# Patient Record
Sex: FEMALE | URBAN - METROPOLITAN AREA
[De-identification: names, ages, dates, MRNs, and addresses within clinical notes are randomized per-mention and may not be internally consistent; named-entity substitution may affect disease eponyms.]

---

## 2019-02-08 ENCOUNTER — THERAPY VISIT (OUTPATIENT)
Dept: PHYSICAL THERAPY | Facility: CLINIC | Age: 39
End: 2019-02-08
Payer: COMMERCIAL

## 2019-02-08 DIAGNOSIS — G89.29 CHRONIC LEFT SHOULDER PAIN: ICD-10-CM

## 2019-02-08 DIAGNOSIS — M25.512 CHRONIC LEFT SHOULDER PAIN: ICD-10-CM

## 2019-02-08 PROCEDURE — 97110 THERAPEUTIC EXERCISES: CPT | Mod: GP | Performed by: PHYSICAL THERAPIST

## 2019-02-08 PROCEDURE — 97161 PT EVAL LOW COMPLEX 20 MIN: CPT | Mod: GP | Performed by: PHYSICAL THERAPIST

## 2019-02-08 NOTE — LETTER
[facility] [place] [facility]  [address]  [address]  [place] [address]  [phone]    2019    Re: Louise Anderson   :   1980  MRN:  [id number]   REFERRING PHYSICIAN:   John Bray    [facility] [place] [facility]    Date of Initial Evaluation: 19  Visits:  Rxs Used: 1  Reason for Referral:  Chronic left shoulder pain    EVALUATION SUMMARY    [facility] Initial Evaluation  Subjective:  Patient reports onset of left shoulder pain beginning 4-6 months ago after participating in body pump classes for awhile. Most recently saw MD for this problem on 19  The history is provided by the patient. No  was used.   Louise Anderson is a 38 year old female with a left shoulder condition.  Condition occurred with:  Repetition/overuse.  Condition occurred: during recreation/sport.  This is a chronic condition   Patient reports pain:  In the joint.    Pain is described as aching and is intermittent and reported as 2/10 (up to 5/10).  Associated symptoms:  Loss of motion/stiffness and loss of strength. Pain is worse during the day.  Symptoms are exacerbated by certain positions, lifting, using arm at shoulder level and using arm overhead and relieved by rest.  Since onset symptoms are unchanged.        General health as reported by patient is good.  Pertinent medical history includes:  None.  Medical allergies: no.  Other surgeries include:  None reported.  Current medications:  None as reported by the patient.  Current occupation is .  Patient is working in normal job without restrictions.  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    Barriers include:  None as reported by the patient.  Red flags:  None as reported by the patient.    Objective:  Standing Alignment:    Shoulder/UE:  Rounded shoulders  Shoulder Evaluation:  ROM:  AROM:    Flexion:  Left:  160     Right:  162  Extension:  Left: 56Right: 65  Abduction:  Left: 160   Right:  165  External Rotation:  Left:  85    Right:  100  Extension/Internal Rotation:  Left:  T4    Right:  T4        Louise Anderson   :   1980      Strength:    Flexion: Left:5-/5    Pain: -    Right: 5/5      Pain:  -  Abduction:  Left: 5-/5   Pain:-/+    Right: 5/5      Pain:-  Internal Rotation:  Left:5/5      Pain:-    Right: 5/5      Pain:-  External Rotation:   Left:4+/5      Pain:-   Right:5/5      Pain:-        Assessment/Plan:    Patient is a 38 year old female with left side shoulder complaints.    Patient has the following significant findings with corresponding treatment plan.                Diagnosis 1:  Left shoulder pain    Pain -  self management, education, directional preference exercise and home program  Decreased ROM/flexibility - manual therapy, therapeutic exercise and home program  Decreased strength - therapeutic exercise, therapeutic activities and home program  Impaired muscle performance - neuro re-education and home program  Decreased function - therapeutic activities and home program  Impaired posture - neuro re-education and home program    Previous and current functional limitations:  (See Goal Flow Sheet for this information)    Short term and Long term goals: (See Goal Flow Sheet for this information)     Communication ability:  Patient appears to be able to clearly communicate and understand verbal and written communication and follow directions correctly.  Treatment Explanation - The following has been discussed with the patient:   RX ordered/plan of care  Anticipated outcomes  Possible risks and side effects  This patient would benefit from PT intervention to resume normal activities.   Rehab potential is good.    Frequency:  1 X week, once daily  Duration:  for 6 weeks  Discharge Plan:  Achieve all LTG.  Independent in home treatment program.  Reach maximal therapeutic benefit.          Thank you for your  referral.    INQUIRIES  Therapist: Sonido Denny DPT, Cert. MDT   [facility] [place] [facility]  [address]  [address]  [place] [address]  Phone: [phone]  Fax: [phone]

## 2019-02-08 NOTE — PROGRESS NOTES
[facility] Initial Evaluation  Subjective:  Patient reports onset of left shoulder pain beginning 4-6 months ago after participating in body pump classes for awhile. Most recently saw MD for this problem on 2/4/19      The history is provided by the patient. No  was used.   Louise Anderson is a 38 year old female with a left shoulder condition.  Condition occurred with:  Repetition/overuse.  Condition occurred: during recreation/sport.  This is a chronic condition     Patient reports pain:  In the joint.    Pain is described as aching and is intermittent and reported as 2/10 (up to 5/10).  Associated symptoms:  Loss of motion/stiffness and loss of strength. Pain is worse during the day.  Symptoms are exacerbated by certain positions, lifting, using arm at shoulder level and using arm overhead and relieved by rest.  Since onset symptoms are unchanged.        General health as reported by patient is good.  Pertinent medical history includes:  None.  Medical allergies: no.  Other surgeries include:  None reported.  Current medications:  None as reported by the patient.  Current occupation is .  Patient is working in normal job without restrictions.  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    Barriers include:  None as reported by the patient.    Red flags:  None as reported by the patient.                        Objective:  Standing Alignment:      Shoulder/UE:  Rounded shoulders                                       Shoulder Evaluation:  ROM:  AROM:    Flexion:  Left:  160     Right:  162  Extension: Left: 56Right: 65  Abduction:  Left: 160   Right:  165      External Rotation:  Left:  85    Right:  100            Extension/Internal Rotation:  Left:  T4    Right:  T4          Strength:    Flexion: Left:5-/5    Pain: -    Right: 5/5      Pain:  -    Abduction:  Left: 5-/5   Pain:-/+    Right: 5/5      Pain:-    Internal Rotation:  Left:5/5      Pain:-     Right: 5/5      Pain:-  External Rotation:   Left:4+/5      Pain:-   Right:5/5      Pain:-                                                     General     ROS    Assessment/Plan:    Patient is a 38 year old female with left side shoulder complaints.    Patient has the following significant findings with corresponding treatment plan.                Diagnosis 1:  Left shoulder pain    Pain -  self management, education, directional preference exercise and home program  Decreased ROM/flexibility - manual therapy, therapeutic exercise and home program  Decreased strength - therapeutic exercise, therapeutic activities and home program  Impaired muscle performance - neuro re-education and home program  Decreased function - therapeutic activities and home program  Impaired posture - neuro re-education and home program    Therapy Evaluation Codes:   1) History comprised of:   Personal factors that impact the plan of care:      None.    Comorbidity factors that impact the plan of care are:      None.     Medications impacting care: None.  2) Examination of Body Systems comprised of:   Body structures and functions that impact the plan of care:      Shoulder.   Activity limitations that impact the plan of care are:      Lifting.  3) Clinical presentation characteristics are:   Stable/Uncomplicated.  4) Decision-Making    Low complexity using standardized patient assessment instrument and/or measureable assessment of functional outcome.  Cumulative Therapy Evaluation is: Low complexity.    Previous and current functional limitations:  (See Goal Flow Sheet for this information)    Short term and Long term goals: (See Goal Flow Sheet for this information)     Communication ability:  Patient appears to be able to clearly communicate and understand verbal and written communication and follow directions correctly.  Treatment Explanation - The following has been discussed with the patient:   RX ordered/plan of care  Anticipated  outcomes  Possible risks and side effects  This patient would benefit from PT intervention to resume normal activities.   Rehab potential is good.    Frequency:  1 X week, once daily  Duration:  for 6 weeks  Discharge Plan:  Achieve all LTG.  Independent in home treatment program.  Reach maximal therapeutic benefit.    Please refer to the daily flowsheet for treatment today, total treatment time and time spent performing 1:1 timed codes.

## 2019-02-15 ENCOUNTER — THERAPY VISIT (OUTPATIENT)
Dept: PHYSICAL THERAPY | Facility: CLINIC | Age: 39
End: 2019-02-15
Payer: COMMERCIAL

## 2019-02-15 DIAGNOSIS — M25.512 CHRONIC LEFT SHOULDER PAIN: ICD-10-CM

## 2019-02-15 DIAGNOSIS — G89.29 CHRONIC LEFT SHOULDER PAIN: ICD-10-CM

## 2019-02-15 PROCEDURE — 97140 MANUAL THERAPY 1/> REGIONS: CPT | Mod: GP | Performed by: PHYSICAL THERAPIST

## 2019-02-15 PROCEDURE — 97112 NEUROMUSCULAR REEDUCATION: CPT | Mod: GP | Performed by: PHYSICAL THERAPIST

## 2019-02-15 PROCEDURE — 97110 THERAPEUTIC EXERCISES: CPT | Mod: GP | Performed by: PHYSICAL THERAPIST

## 2019-05-10 PROBLEM — M25.512 CHRONIC LEFT SHOULDER PAIN: Status: RESOLVED | Noted: 2019-02-08 | Resolved: 2019-05-10

## 2019-05-10 PROBLEM — G89.29 CHRONIC LEFT SHOULDER PAIN: Status: RESOLVED | Noted: 2019-02-08 | Resolved: 2019-05-10

## 2019-05-10 NOTE — PROGRESS NOTES
Subjective:  HPI                    Objective:  System    Physical Exam    General     ROS    Assessment/Plan:    DISCHARGE REPORT    Progress reporting period is from 2/15/19 to 5/10/19.     SUBJECTIVE  Subjective: Pt reports her shoulder feels slightly better and doesn't have the dull ache; she reports inconsistency with extension stretching due to traveling for work   Current Pain level: 2/10   Initial Pain level: 2/10   Changes in function: Yes, see goal flow sheet for change in function   Adverse reactions: None;   ,     Patient has failed to return to therapy so current objective findings are unknown.  The subjective and objective information are from the last SOAP note on this patient.    OBJECTIVE  Objective: AROM left shoulder flexion 160, abd 170, ER 90; tight posterior capsule      ASSESSMENT/PLAN  Updated problem list and treatment plan: Diagnosis 1:  Left shoulder pain      Pain -  home program  Decreased function - home program  STG/LTGs have been met or progress has been made towards goals:  Yes (See Goal flow sheet completed today.)  Assessment of Progress: The patient has not returned to therapy. Current status is unknown.  Self Management Plans:  Patient has been instructed in a home treatment program.  Patient is independent in a home treatment program.  Patient  has been instructed in self management of symptoms.  Patient is independent in self management of symptoms.  I have re-evaluated this patient and find that the nature, scope, duration and intensity of the therapy is appropriate for the medical condition of the patient.  Louise continues to require the following intervention to meet STG and LTG's: PT intervention is no longer required to meet STG/LTG.  The patient failed to complete scheduled/ordered appointments so current information is unknown.  We will discharge this patient from PT.    Recommendations:  This patient is ready to be discharged from therapy and continue their home  treatment program.    Please refer to the daily flowsheet for treatment today, total treatment time and time spent performing 1:1 timed codes.

## 2021-06-01 ENCOUNTER — RECORDS - HEALTHEAST (OUTPATIENT)
Dept: ADMINISTRATIVE | Facility: CLINIC | Age: 41
End: 2021-06-01